# Patient Record
Sex: FEMALE
[De-identification: names, ages, dates, MRNs, and addresses within clinical notes are randomized per-mention and may not be internally consistent; named-entity substitution may affect disease eponyms.]

---

## 2023-05-23 ENCOUNTER — NURSE TRIAGE (OUTPATIENT)
Dept: OTHER | Facility: CLINIC | Age: 23
End: 2023-05-23

## 2023-05-24 NOTE — TELEPHONE ENCOUNTER
Received call from Kendell Rosales at [facility] Name: Henny Hernandez MRN: [id number]    Subjective: Caller states \"28 weeks. Went to bathroom bright red blood and clots. \"     AMEIRCO: 8/13/23     Current Symptoms: abnormal vaginal bleeding with clots, mild abdominal pain, diarrhea     Onset: 21:30     Associated Symptoms: diarrhea    Pain Severity: 4/10; cramping; intermittent    What has been tried: nothing     What makes it better or worse: nothing     Triage indicates for patient to  GO to [facility]     Care advice provided, patient verbalizes understanding; denies any other questions or concerns; instructed to call back for any new or worsening symptoms.     Go to [facility]     This triage is a result of a call to the [address] [place] [address]    Reason for Disposition   MILD-MODERATE vaginal bleeding (e.g., small to medium clots; like mild menstrual period)  (Exception: Single episode of faint spotting when wiping, or slight spotting after intercourse or pelvic exam.)    Protocols used: Pregnancy - Vaginal Bleeding Greater Than 20 Weeks [id number]

## 2023-08-18 ENCOUNTER — NURSE TRIAGE (OUTPATIENT)
Dept: OTHER | Facility: CLINIC | Age: 23
End: 2023-08-18

## 2023-08-18 NOTE — TELEPHONE ENCOUNTER
Reason for Disposition   Normal postpartum bleeding    Protocols used: Postpartum - Vaginal Bleeding and Lochia-ADULT-AH      Received call from [facility] at [facility] Name: Vazquez Spears MRN: [id number]    Subjective: Caller states \"I am 2 days PP and a golf ball size dark red clot came out. Should I be worried? \"     Current Symptoms: Post Partum. Vaginal delivery 8/16/23. Onset: ongoing    Associated Symptoms: NA    Pain Severity: 8/10; intermittent - pain only present when cramping. Lasts about 5 minutes when occurs. What has been tried: nothing    What makes it better or worse: NA    Triage indicates for patient to [address] advice provided, patient verbalizes understanding; denies any other questions or concerns; instructed to call back for any new or worsening symptoms. Home care - call back if you develop a fever, soaking a 1-2 pads and hour or clots become larger and persist or you become dizzy.     This triage is a result of a call to the [address]